# Patient Record
Sex: MALE | Race: WHITE | NOT HISPANIC OR LATINO | Employment: OTHER | URBAN - NONMETROPOLITAN AREA
[De-identification: names, ages, dates, MRNs, and addresses within clinical notes are randomized per-mention and may not be internally consistent; named-entity substitution may affect disease eponyms.]

---

## 2017-10-24 ENCOUNTER — OFFICE VISIT (OUTPATIENT)
Dept: FAMILY MEDICINE CLINIC | Facility: CLINIC | Age: 67
End: 2017-10-24

## 2017-10-24 DIAGNOSIS — G25.81 RESTLESS LEG SYNDROME: ICD-10-CM

## 2017-10-24 DIAGNOSIS — Z79.899 LONG TERM USE OF DRUG: ICD-10-CM

## 2017-10-24 DIAGNOSIS — I10 ESSENTIAL HYPERTENSION: ICD-10-CM

## 2017-10-24 DIAGNOSIS — K21.9 GASTROESOPHAGEAL REFLUX DISEASE WITHOUT ESOPHAGITIS: ICD-10-CM

## 2017-10-24 DIAGNOSIS — G51.8 PAIN DUE TO NEUROPATHY OF FACIAL NERVE: ICD-10-CM

## 2017-10-24 DIAGNOSIS — Z87.898 HISTORY OF PREDIABETES: ICD-10-CM

## 2017-10-24 DIAGNOSIS — G51.0 RIGHT-SIDED BELL'S PALSY: Primary | ICD-10-CM

## 2017-10-24 PROCEDURE — 80307 DRUG TEST PRSMV CHEM ANLYZR: CPT | Performed by: NURSE PRACTITIONER

## 2017-10-24 PROCEDURE — 99214 OFFICE O/P EST MOD 30 MIN: CPT | Performed by: NURSE PRACTITIONER

## 2017-10-24 RX ORDER — GABAPENTIN 100 MG/1
100 CAPSULE ORAL 3 TIMES DAILY
COMMUNITY
End: 2017-11-20 | Stop reason: DRUGHIGH

## 2017-10-24 NOTE — PROGRESS NOTES
Subjective   Álvaro Estrada is a 67 y.o. male who presents to the office to establish care and get referral to neuro for Bell's Palsy.     History of Present Illness     Patient states that this Bell's palsy started back in July of this year, patient went to the ER for this and stated that they placed him on prednisone and antibiotic and did a CT scan which was normal because they originally thought that he had a stroke.  Patient states that he had no arm or leg weakness and is continued to have no arm or leg weakness.  Patient went to the emergency room only because the right side of his face was drooping.  Patient states that the right sided nerve pain starts underneath his right ear goes up the right side of his face always up to his right eye, patient states that he has a constant dull pain but then random sharp pains as well along this nerve line.  Patient complains of watery eyes multiple times throughout the day.  Patient states that the air while there is his eyes when he goes outside. Taking oxtellar xr 300 mg BID prn for Bell's Palsy, along with soothing dry eye drops daily and ointment nightly.  The medication is helping with his eyes, however the oxtellar helps minimally and patient is still having significant right sided nerve pain.  Patient denies any problems with vision, swallowing, smell.  Patient has no known exposures to any viral infections since or any time before this Bell's palsy started.  Patient has tried to get referral to neuro but was unsuccessful and would really like to be referred to neurology since this has gone on for so long.     History:  1. RLS-takes Neupro 2 mg every 24 hrs and parmipexole 1 mg nightly, x years, medications work well, experiences restless leg syndrome during the day and at nighttime  2. BP-lisinopril 10mg daily and Traim terg 37.5/25mg qday after meal, patient does not check blood pressure at home but denies any associated symptoms with hypertension, has had this  "for several years  3. PreDM-metformin 500mg bid, patient states that lab work showed that he was a prediabetic, no side effects with medication.  4. Knee surgery recently, was on tramadol, no longer taking it,   5. GERD-controlled well on Omeprazole 20 mg PO daily  6.  Varicose veins in left leg  7. Mass on right side of face, patient states that he fell when he was 12 years old has been there ever since, patient denies any pain or issues with it on states it is not gotten any bigger.  Hard mass 3x3x2\" as a rough estimate  8.  Bell's palsy since July 2017    Patient going to the [facility] on [address] before, will request these records and review them to determine further care in addition to care provided today.    The following portions of the patient's history were reviewed and updated as appropriate: allergies, current medications, past family history, past medical history, past social history, past surgical history and problem list.    Review of Systems   Constitutional: Negative for activity change, appetite change, chills, diaphoresis, fatigue and fever.   HENT: Positive for drooling, ear pain (right), facial swelling, hearing loss and sinus pressure. Negative for congestion, dental problem, ear discharge, mouth sores, nosebleeds, postnasal drip, rhinorrhea, sinus pain, sneezing, sore throat, tinnitus and trouble swallowing.         Right sided facial dropping   Eyes: Positive for photophobia, pain, discharge, redness and itching. Negative for visual disturbance (sensitive to outside air).   Respiratory: Negative.  Negative for cough, chest tightness, shortness of breath and wheezing.    Cardiovascular: Negative.    Gastrointestinal: Negative.    Endocrine: Negative.    Musculoskeletal: Negative.    Skin: Negative.    Allergic/Immunologic: Negative.    Neurological: Negative.    Hematological: Negative.    Psychiatric/Behavioral: Negative.        No past medical history on file.    No family history on " file.       Objective   There were no vitals taken for this visit.  Physical Exam   Constitutional: He is oriented to person, place, and time. He appears well-developed and well-nourished. He is cooperative. He does not appear ill.   HENT:   Head:       Right Ear: Hearing, external ear and ear canal normal. Tympanic membrane is bulging.   Left Ear: Hearing, tympanic membrane, external ear and ear canal normal.   Nose: Mucosal edema and rhinorrhea present. Right sinus exhibits maxillary sinus tenderness ( slightly). Left sinus exhibits maxillary sinus tenderness (slightly).   Mouth/Throat: Oropharynx is clear and moist and mucous membranes are normal. No oropharyngeal exudate.   Eyes: EOM and lids are normal. Pupils are equal, round, and reactive to light. Right eye exhibits discharge ( clear watery discharge). Left eye exhibits discharge (clear watery discharge). Right conjunctiva is injected. Left conjunctiva is injected. Right eye exhibits normal extraocular motion and no nystagmus. Left eye exhibits normal extraocular motion and no nystagmus.   Neck: Normal range of motion. Neck supple.   Cardiovascular: Normal rate, regular rhythm, normal heart sounds and intact distal pulses.  Exam reveals no gallop and no friction rub.    No murmur heard.  Pulmonary/Chest: Effort normal and breath sounds normal. No respiratory distress. He has no wheezes. He has no rales.   Abdominal: Soft. Normal appearance, normal aorta and bowel sounds are normal. He exhibits no distension and no mass. There is no tenderness. There is no rebound and no guarding. No hernia.   Musculoskeletal: Normal range of motion. He exhibits no edema, tenderness or deformity.   Lymphadenopathy:     He has no cervical adenopathy.   Neurological: He is alert and oriented to person, place, and time. He has normal strength and normal reflexes. He displays normal reflexes. No cranial nerve deficit or sensory deficit. He exhibits normal muscle tone. He displays  a negative Romberg sign. Coordination normal.   Reflex Scores:       Patellar reflexes are 2+ on the right side and 2+ on the left side.  Skin: Skin is warm, dry and intact. No rash noted. He is not diaphoretic. No erythema. No pallor.   Psychiatric: He has a normal mood and affect. His speech is normal and behavior is normal. Thought content normal. Cognition and memory are normal. He is attentive.   Nursing note and vitals reviewed.       No flowsheet data found.      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  Did urine drug screen, will consider Neurontin plasma level if patient continues on Neurontin.  Referred to neurology.    Patient here to establish care, performed full history and physical.  Reviewed medical problems, patient did not need any refills at this time.  Will order lab work once I have gotten his records and free clinic in [place], if applicable.      Patient understands the risks associated with this controlled medication, including tolerance and addiction.  he also agrees to only obtain this medication from me or my collaborating physician, Dr. Adrian Watt, and not from a another provider, unless that provider is covering for me in my absence.  he also agrees to be compliant in dosing, and not self adjust the dose of medication.  A signed  controlled substance agreement is on file, and he has received a controlled substance education sheet at this visit.  he has also signed a consent for treatment with a controlled substance as per [facility] policy. IVORY was obtained.     Follow-up in one month.  Patient educated to call or follow-up sooner if condition worsens or does not get better.  Patient understands and in agreement with plan of care.  An After Visit Summary was printed and given to the patient.    Next appt in 1 mtn:  1.  Health maintenance  2.  Refills?  3.  Bell's palsy/neuro referral/neurotonin/consider neurotonin level        Current Outpatient Prescriptions:   •  gabapentin (NEURONTIN) 100 MG capsule, Take 100 mg by mouth 3 (Three) Times a Day. 2 tablets, TID prn for bells palsy, #180, NR by Dr. Watt, written script on 10/24/2017, Disp: , Rfl:   •  lisinopril (PRINIVIL,ZESTRIL) 10 MG tablet, Take 10 mg by mouth Daily. For HTN, Disp: , Rfl:   •  metFORMIN (GLUCOPHAGE) 500 MG tablet, Take 500 mg by mouth 2 (Two) Times a Day With Meals. For GERD, Disp: , Rfl:   •  omeprazole (priLOSEC) 20 MG capsule, Take 20 mg by mouth Daily. For GERD, Disp: , Rfl:   •  OXcarbazepine (TRILEPTAL) 300 MG tablet, Take 300 mg by mouth 2 (Two) Times a Day. XR 2 tablets daily for Bell's Palsy, Disp: , Rfl:   •  pramipexole (MIRAPEX) 1 MG tablet, Take 1 mg by mouth Every Night. For RLS, Disp: , Rfl:   •  rotigotine (NEUPRO) 1 MG/24HR patch 24 hour 24 hour patch, Place 1 patch on the skin Daily. For RLS, Disp: , Rfl:   •  triamterene-hydrochlorothiazide (MAXZIDE-25) 37.5-25 MG per tablet, Take 1 tablet by mouth Daily. Daily for HTN, Disp: , Rfl:       LILIANA Gonzalez        This document has been electronically signed by LILIANA Gonzalez on October 25, 2017 4:36 PM

## 2017-10-25 PROBLEM — K21.9 GASTROESOPHAGEAL REFLUX DISEASE WITHOUT ESOPHAGITIS: Status: ACTIVE | Noted: 2017-10-25

## 2017-10-25 PROBLEM — G25.81 RESTLESS LEG SYNDROME: Status: ACTIVE | Noted: 2017-10-25

## 2017-10-25 PROBLEM — Z87.898 HISTORY OF PREDIABETES: Status: ACTIVE | Noted: 2017-10-25

## 2017-10-25 PROBLEM — I10 ESSENTIAL HYPERTENSION: Status: ACTIVE | Noted: 2017-10-25

## 2017-10-25 LAB
AMPHET+METHAMPHET UR QL: NEGATIVE
BARBITURATES UR QL SCN: NEGATIVE
BENZODIAZ UR QL SCN: NEGATIVE
CANNABINOIDS SERPL QL: NEGATIVE
COCAINE UR QL: NEGATIVE
METHADONE UR QL SCN: NEGATIVE
OPIATES UR QL: NEGATIVE
OXYCODONE UR QL SCN: NEGATIVE

## 2017-10-25 RX ORDER — TRIAMTERENE AND HYDROCHLOROTHIAZIDE 37.5; 25 MG/1; MG/1
1 TABLET ORAL DAILY
COMMUNITY
End: 2018-05-04

## 2017-10-25 RX ORDER — LISINOPRIL 10 MG/1
10 TABLET ORAL DAILY
COMMUNITY
End: 2018-05-04

## 2017-10-25 RX ORDER — PRAMIPEXOLE DIHYDROCHLORIDE 1 MG/1
1 TABLET ORAL NIGHTLY
COMMUNITY
End: 2018-05-04 | Stop reason: SDUPTHER

## 2017-10-25 RX ORDER — OXCARBAZEPINE 300 MG/1
300 TABLET, FILM COATED ORAL 2 TIMES DAILY
COMMUNITY
End: 2018-05-04

## 2017-10-25 RX ORDER — OMEPRAZOLE 20 MG/1
20 CAPSULE, DELAYED RELEASE ORAL DAILY
COMMUNITY
End: 2019-06-28 | Stop reason: ALTCHOICE

## 2017-10-30 ENCOUNTER — DOCUMENTATION (OUTPATIENT)
Dept: FAMILY MEDICINE CLINIC | Facility: CLINIC | Age: 67
End: 2017-10-30

## 2017-10-30 NOTE — PROGRESS NOTES
Reviewed patient's records form [facility] ([facility]) in [place], [place].    On visit 3-9-17, patient stated that mandibular mass that he had on the right service face had been hurting for the past week with the first bite of food, patient denies any increase in size.  Patient was told to follow up if gotten worse, not mentioned in any other notes and during patient's visit with me, patient denied a bothering him at all in the past or presently.  Patient did start to have Bell's palsy symptoms on 7-.      Visit 7- follow-up from ER visit for Bell's palsy, prescribed Zostavax ×7 days and Medrol Dosepak.  Patient also started on metformin 500 mg twice a day due to elevated A1c.    Visit 9- patient still experiencing Bell's palsy, prescribed Oxcarbazepine 300 mg extended release twice a day when necessary and lubrifresh ointment for dry eyes.     Family history of hypertension on the father's side and macular degeneration on the mother's side.  Personal history of 30 pack years of smoking and surgical history of S/P of left knee arthroscopy.    Pertinent lab work:  10-4-16 CBC, vitamin B12 326, folate 12.4, vitamin D 24 low, total cholesterol 214 high,  high, blood sugar 95, CMP done, A1c 7.27 high  8-2-17 microalbumin normal, lipid panel showing total cholesterol 185, HDL 41, triglycerides 91,  high BUNs 33 high creatinine 0.9 to normal, CMP done, PSA 1, TSH normal, A1c 6.5 elevated  3-9-17 A1c 7.0 elevated  10-11-17 A1c 6.0 slightly elevated, CBC completed and normal

## 2017-10-30 NOTE — PROGRESS NOTES
Reviewed patient's ER visit at [facility] in [facility] on 6-.  Measures taken to rule out stroke, during this visit patient only had right sided facial issues, no issues with lower extremities or upper extremities.     Lab work done included   1.  EKG: Normal sinus rhythm with right bundle branch block with septal infarct  2.  Chest x-ray showed mild fluid volume overload, mild pulmonary edema  3. CT scan of head negative  4.  CMP, CBC  5. Lipid panel: Triglycerides 111, total cholesterol 182, HDL 99,   6. PT/INR    DX: Right sided cerebral palsy  Txt: Acyclovir 800mg 5x/day x 7days, artificial tears, medrol dose pack    Will discuss abnormal EKG and CXR with patient

## 2017-10-31 ENCOUNTER — TELEPHONE (OUTPATIENT)
Dept: FAMILY MEDICINE CLINIC | Facility: CLINIC | Age: 67
End: 2017-10-31

## 2017-11-02 NOTE — TELEPHONE ENCOUNTER
Spoke with patient earlier he called in this morning at around 8 am.    Relayed info below and changed his appt on 11/20 to 1030 am      Also pt stated that when he had EKG and chest xray they told him everything was fine

## 2017-11-20 ENCOUNTER — OFFICE VISIT (OUTPATIENT)
Dept: FAMILY MEDICINE CLINIC | Facility: CLINIC | Age: 67
End: 2017-11-20

## 2017-11-20 VITALS
TEMPERATURE: 97.7 F | SYSTOLIC BLOOD PRESSURE: 122 MMHG | RESPIRATION RATE: 16 BRPM | HEIGHT: 69 IN | WEIGHT: 215 LBS | DIASTOLIC BLOOD PRESSURE: 82 MMHG | OXYGEN SATURATION: 99 % | BODY MASS INDEX: 31.84 KG/M2 | HEART RATE: 79 BPM

## 2017-11-20 DIAGNOSIS — G51.0 RIGHT-SIDED BELL'S PALSY: Primary | ICD-10-CM

## 2017-11-20 PROCEDURE — 99213 OFFICE O/P EST LOW 20 MIN: CPT | Performed by: NURSE PRACTITIONER

## 2017-11-20 RX ORDER — GABAPENTIN 600 MG/1
600 TABLET ORAL 3 TIMES DAILY
COMMUNITY
End: 2018-05-04

## 2017-11-20 NOTE — PROGRESS NOTES
Subjective   Álvaro Estrada is a 67 y.o. male who presents to the office for F/U on bell's palsy.     History of Present Illness     Patient states that this Bell's palsy started back in July of this year, patient went to the ER for this and stated that they placed him on prednisone and antibiotic and did a CT scan which was normal because they originally thought that he had a stroke.  Patient states that he had no arm or leg weakness and is continued to have no arm or leg weakness.  Patient went to the emergency room only because the right side of his face was drooping.  Patient states that the right sided nerve pain starts underneath his right ear goes up the right side of his face always up to his right eye, patient states that he has a constant dull pain but then random sharp pains as well along this nerve line.  Patient complains of watery eyes multiple times throughout the day.  Patient states that the air while there is his eyes when he goes outside. Patient was initially taking oxtellar xr 300 mg BID prn for Bell's Palsy, along with soothing dry eye drops daily and ointment nightly.  The medication is helping with his eyes, however the oxtellar helped minimally and patient is still having significant right sided nerve pain.  Patient denies any problems with vision, swallowing, smell.  Patient has no known exposures to any viral infections since or any time before this Bell's palsy started.  Patient was then switched to gabapentin 200 mg TID for neuropathic pain related to Bell's palsy and referred to neuro Dr. Rodriguez in [facility].  Patient states that he has seen Dr. Rodriguez neurologist, that patient was changed to Neurontin 600 mg 3 times a day, he has an EEG scheduled for tomorrow, his vitamin B12 was low and he will be following up with in to be placed on supplements, and patient has an MRI scheduled for the 28th of this month.  Patient states that his pain is well controlled on the Neurontin 600 mg 3  times a day.  Patient states the condition has not changed except for less pain on the Neurontin, states that he is still having a lot of watery eye problems but the medication such as the eyedrops and ointment is helping.  Patient states that he follows up with Dr. Rodriguez on the 30th of this month.    Patient denies any chest pain, shortness of breath, heart palpitations, trouble breathing at nighttime, or any other issues that may indicate the need for follow-up EKG and chest x-ray from his abnormal EKG and chest x-ray on ER report.      Updated health maintenance, patient had TDap done in 2005 and refused other health measures.     The following portions of the patient's history were reviewed and updated as appropriate: allergies, current medications, past family history, past medical history, past social history, past surgical history and problem list.    Review of Systems   Constitutional: Negative for activity change, appetite change, chills, diaphoresis, fatigue and fever.   HENT: Positive for drooling, ear pain (right), facial swelling, hearing loss and sinus pressure. Negative for congestion, dental problem, ear discharge, mouth sores, nosebleeds, postnasal drip, rhinorrhea, sinus pain, sneezing, sore throat, tinnitus and trouble swallowing.         Right sided facial dropping   Eyes: Positive for photophobia, pain, discharge, redness and itching. Negative for visual disturbance (sensitive to outside air).   Respiratory: Negative.  Negative for cough, chest tightness, shortness of breath and wheezing.    Cardiovascular: Negative.    Gastrointestinal: Negative.    Endocrine: Negative.    Musculoskeletal: Negative.    Skin: Negative.    Allergic/Immunologic: Negative.    Neurological: Negative.    Hematological: Negative.    Psychiatric/Behavioral: Negative.        No past medical history on file.    No family history on file.       Objective   /82  Pulse 79  Temp 97.7 °F (36.5 °C) (Oral)   Resp 16  Ht  "69\" (175.3 cm)  Wt 215 lb (97.5 kg)  SpO2 99%  BMI 31.75 kg/m2  Physical Exam   Constitutional: He is oriented to person, place, and time. He appears well-developed and well-nourished. He is cooperative. He does not appear ill.   HENT:   Head:       Right Ear: Hearing, external ear and ear canal normal. Tympanic membrane is bulging.   Left Ear: Hearing, tympanic membrane, external ear and ear canal normal.   Nose: Mucosal edema and rhinorrhea present. Right sinus exhibits maxillary sinus tenderness ( slightly). Left sinus exhibits maxillary sinus tenderness (slightly).   Mouth/Throat: Oropharynx is clear and moist and mucous membranes are normal. No oropharyngeal exudate.   Eyes: EOM and lids are normal. Pupils are equal, round, and reactive to light. Right eye exhibits discharge ( clear watery discharge). Left eye exhibits discharge (clear watery discharge). Right conjunctiva is injected. Left conjunctiva is injected. Right eye exhibits normal extraocular motion and no nystagmus. Left eye exhibits normal extraocular motion and no nystagmus.   Neck: Normal range of motion. Neck supple.   Cardiovascular: Normal rate, regular rhythm, normal heart sounds and intact distal pulses.  Exam reveals no gallop and no friction rub.    No murmur heard.  Pulmonary/Chest: Effort normal and breath sounds normal. No respiratory distress. He has no wheezes. He has no rales.   Abdominal: Soft. Normal appearance, normal aorta and bowel sounds are normal. He exhibits no distension and no mass. There is no tenderness. There is no rebound and no guarding. No hernia.   Musculoskeletal: Normal range of motion. He exhibits no edema, tenderness or deformity.   Lymphadenopathy:     He has no cervical adenopathy.   Neurological: He is alert and oriented to person, place, and time. He has normal strength and normal reflexes. He displays normal reflexes. No cranial nerve deficit or sensory deficit. He exhibits normal muscle tone. He displays " a negative Romberg sign. Coordination normal.   Reflex Scores:       Patellar reflexes are 2+ on the right side and 2+ on the left side.  Skin: Skin is warm, dry and intact. No rash noted. He is not diaphoretic. No erythema. No pallor.   Psychiatric: He has a normal mood and affect. His speech is normal and behavior is normal. Thought content normal. Cognition and memory are normal. He is attentive.   Nursing note and vitals reviewed.       PHQ-2/PHQ-9 Depression Screening 11/20/2017   Little interest or pleasure in doing things 0   Feeling down, depressed, or hopeless 0   Total Score 0         Assessment/Plan   Álvaro was seen today for follow-up.    Diagnoses and all orders for this visit:    Right-sided Bell's palsy         Right-sided Bell's palsy-continue to follow up with neurologist, continue eye ointment and drops, continue gabapentin at new dosage, follow-up as needed    Patient educated to follow-up sooner than next scheduled appointment if condition(s) worse or do not improve. Patient states understanding and is in agreeance with plan of care. An After Visit Summary was printed and given to the patient.      Current Outpatient Prescriptions:   •  gabapentin (NEURONTIN) 600 MG tablet, Take 600 mg by mouth 3 (Three) Times a Day., Disp: , Rfl:   •  lisinopril (PRINIVIL,ZESTRIL) 10 MG tablet, Take 10 mg by mouth Daily. For HTN, Disp: , Rfl:   •  omeprazole (priLOSEC) 20 MG capsule, Take 20 mg by mouth Daily. For GERD, Disp: , Rfl:   •  OXcarbazepine (TRILEPTAL) 300 MG tablet, Take 300 mg by mouth 2 (Two) Times a Day. XR 2 tablets daily for Bell's Palsy, Disp: , Rfl:   •  pramipexole (MIRAPEX) 1 MG tablet, Take 1 mg by mouth Every Night. For RLS, Disp: , Rfl:   •  rotigotine (NEUPRO) 1 MG/24HR patch 24 hour 24 hour patch, Place 1 patch on the skin Daily. For RLS, Disp: , Rfl:   •  triamterene-hydrochlorothiazide (MAXZIDE-25) 37.5-25 MG per tablet, Take 1 tablet by mouth Daily. Daily for HTN, Disp: , Rfl:   •   metFORMIN (GLUCOPHAGE) 500 MG tablet, Take 500 mg by mouth 2 (Two) Times a Day With Meals. For GERD, Disp: , Rfl:       LILIANA Gonzalez        This document has been electronically signed by LILIANA Gonzalez on November 20, 2017 4:35 PM

## 2018-05-04 ENCOUNTER — OFFICE VISIT (OUTPATIENT)
Dept: FAMILY MEDICINE CLINIC | Facility: CLINIC | Age: 68
End: 2018-05-04

## 2018-05-04 VITALS
DIASTOLIC BLOOD PRESSURE: 72 MMHG | SYSTOLIC BLOOD PRESSURE: 118 MMHG | TEMPERATURE: 99.1 F | BODY MASS INDEX: 28.44 KG/M2 | HEIGHT: 69 IN | HEART RATE: 88 BPM | WEIGHT: 192 LBS | OXYGEN SATURATION: 97 %

## 2018-05-04 DIAGNOSIS — K21.9 GASTROESOPHAGEAL REFLUX DISEASE WITHOUT ESOPHAGITIS: ICD-10-CM

## 2018-05-04 DIAGNOSIS — Z90.49 HISTORY OF PAROTIDECTOMY: ICD-10-CM

## 2018-05-04 DIAGNOSIS — Z76.89 ENCOUNTER TO ESTABLISH CARE WITH NEW DOCTOR: ICD-10-CM

## 2018-05-04 DIAGNOSIS — K21.9 GASTROESOPHAGEAL REFLUX DISEASE, ESOPHAGITIS PRESENCE NOT SPECIFIED: ICD-10-CM

## 2018-05-04 DIAGNOSIS — G25.81 RESTLESS LEG SYNDROME: ICD-10-CM

## 2018-05-04 DIAGNOSIS — I82.402 ACUTE DEEP VEIN THROMBOSIS (DVT) OF LEFT LOWER EXTREMITY, UNSPECIFIED VEIN (HCC): ICD-10-CM

## 2018-05-04 DIAGNOSIS — H90.5 SENSORINEURAL HEARING LOSS (SNHL) OF RIGHT EAR, UNSPECIFIED HEARING STATUS ON CONTRALATERAL SIDE: ICD-10-CM

## 2018-05-04 DIAGNOSIS — Z93.1 GASTROSTOMY TUBE IN PLACE (HCC): ICD-10-CM

## 2018-05-04 DIAGNOSIS — C07 CARCINOMA OF PAROTID GLAND (HCC): Primary | ICD-10-CM

## 2018-05-04 DIAGNOSIS — E66.3 OVERWEIGHT (BMI 25.0-29.9): ICD-10-CM

## 2018-05-04 PROBLEM — G51.0 RIGHT-SIDED BELL'S PALSY: Status: RESOLVED | Noted: 2017-10-24 | Resolved: 2018-05-04

## 2018-05-04 PROBLEM — I10 ESSENTIAL HYPERTENSION: Status: RESOLVED | Noted: 2017-10-25 | Resolved: 2018-05-04

## 2018-05-04 PROBLEM — Z87.898 HISTORY OF PREDIABETES: Status: RESOLVED | Noted: 2017-10-25 | Resolved: 2018-05-04

## 2018-05-04 PROCEDURE — 99214 OFFICE O/P EST MOD 30 MIN: CPT | Performed by: FAMILY MEDICINE

## 2018-05-04 RX ORDER — EMOLLIENT COMBINATION NO.10
EMULSION (GRAM) TOPICAL
Refills: 0 | COMMUNITY
Start: 2018-04-04 | End: 2018-12-20

## 2018-05-04 RX ORDER — PRAMIPEXOLE DIHYDROCHLORIDE 1 MG/1
1 TABLET ORAL 2 TIMES DAILY
Qty: 180 TABLET | Refills: 0 | Status: SHIPPED | OUTPATIENT
Start: 2018-05-04 | End: 2018-09-28 | Stop reason: SDUPTHER

## 2018-05-04 RX ORDER — ONDANSETRON 4 MG/1
4 TABLET, FILM COATED ORAL EVERY 8 HOURS PRN
COMMUNITY
End: 2019-06-28

## 2018-05-04 RX ORDER — APIXABAN 5 MG/1
1 TABLET, FILM COATED ORAL 2 TIMES DAILY
Refills: 5 | COMMUNITY
Start: 2018-04-23 | End: 2018-12-20 | Stop reason: DRUGHIGH

## 2018-05-04 RX ORDER — LACTOSE-REDUCED FOOD/FIBER
LIQUID (ML) ORAL
COMMUNITY
End: 2018-12-20

## 2018-05-04 RX ORDER — HYDROCODONE BITARTRATE AND ACETAMINOPHEN 5; 325 MG/1; MG/1
1 TABLET ORAL
Refills: 0 | COMMUNITY
Start: 2018-04-12

## 2018-05-04 RX ORDER — PRAMIPEXOLE DIHYDROCHLORIDE 1 MG/1
1 TABLET ORAL NIGHTLY
Qty: 90 TABLET | Refills: 1 | Status: SHIPPED | OUTPATIENT
Start: 2018-05-04 | End: 2018-05-04 | Stop reason: SDUPTHER

## 2018-05-04 RX ORDER — ROTIGOTINE 2 MG/24H
PATCH, EXTENDED RELEASE TRANSDERMAL
Refills: 1 | COMMUNITY
Start: 2018-02-07 | End: 2018-05-04

## 2018-05-04 RX ORDER — OXYCODONE AND ACETAMINOPHEN 10; 325 MG/1; MG/1
TABLET ORAL
Refills: 0 | COMMUNITY
Start: 2018-03-14

## 2018-05-04 NOTE — PROGRESS NOTES
Subjective   Chief Complaint   Patient presents with   • Establish Care   • Leg Swelling     left leg since April, started after cancer radiation     Álvaro Estrada is a 68 y.o. male.   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  He was then referred to Dr Ramon for further treatment - chemotherapy and radiation have been performed and finished April 5th.  Port placed for chemotherapy on the left upper chest wall and G tube surgery was performed by Dr Polanco.   Patient is to wait 3 months for any additional treatment but is scheduled for a recheck with ENT scheduled May 22nd.  PET scan is ordered with Dr Ramon - July 23rd  Patient has been complaining of poor appetite and lack of nutrition - he was started on Jevity 2 bottles BID.  He could not tolerate 1 bottle every 2 hours.  Patient started experiencing left lower extremity edema and pain following the completion of radiation- he was diagnosed with a DVT and started on eliquis.    The following portions of the patient's history were reviewed and updated as appropriate: allergies, current medications, past family history, past medical history, past social history,  "past surgical history and problem list.    Review of Systems   Constitutional: Negative for appetite change, chills, fatigue and fever.   HENT: Negative for congestion, ear pain, rhinorrhea and sore throat.    Eyes: Negative for pain.   Respiratory: Negative for cough and shortness of breath.    Cardiovascular: Positive for leg swelling. Negative for chest pain and palpitations.   Gastrointestinal: Negative for abdominal pain, constipation, diarrhea and nausea.   Genitourinary: Negative for dysuria.   Musculoskeletal: Negative for back pain, joint swelling and neck pain.   Skin: Negative for rash.   Neurological: Negative for dizziness and headaches.   Psychiatric/Behavioral: Positive for sleep disturbance.       Objective   /72   Pulse 88   Temp 99.1 °F (37.3 °C) (Temporal Artery )   Ht 175.3 cm (69\")   Wt 87.1 kg (192 lb)   SpO2 97%   BMI 28.35 kg/m²   Physical Exam   Constitutional: He is oriented to person, place, and time. He appears well-developed and well-nourished.   HENT:   Head: Normocephalic and atraumatic.       Right Ear: Decreased hearing is noted.   Eyes: Pupils are equal, round, and reactive to light.   Neck: Neck supple. Muscular tenderness present. Decreased range of motion present.   Cardiovascular: Normal rate, regular rhythm and normal heart sounds.    Left lower extremity edema   Pulmonary/Chest: Effort normal and breath sounds normal. No respiratory distress. He has no wheezes. He has no rales.   Abdominal: Soft. Bowel sounds are normal.   Gastrostomy tube in place   Neurological: He is alert and oriented to person, place, and time.   Skin: Skin is warm and dry.   Psychiatric: He has a normal mood and affect.   Nursing note and vitals reviewed.      Assessment/Plan   Problems Addressed this Visit        Cardiovascular and Mediastinum    Acute deep vein thrombosis (DVT) of left lower extremity       Digestive    Gastroesophageal reflux disease without esophagitis    Gastrostomy tube " in place    Gastroesophageal reflux disease       Other    Restless leg syndrome    History of parotidectomy    Carcinoma of parotid gland - Primary    Overweight (BMI 25.0-29.9)      Other Visit Diagnoses     Encounter to establish care with new doctor        Sensorineural hearing loss (SNHL) of right ear, unspecified hearing status on contralateral side            derian aguilera [id number]  Patient will be following with oncology for controlled medications    Adjusted mirapex    Medical records reviewed with patient, copies made    Recheck in 3 months

## 2018-06-07 ENCOUNTER — OFFICE VISIT (OUTPATIENT)
Dept: FAMILY MEDICINE CLINIC | Facility: CLINIC | Age: 68
End: 2018-06-07

## 2018-06-07 VITALS
BODY MASS INDEX: 28.58 KG/M2 | OXYGEN SATURATION: 99 % | HEIGHT: 69 IN | WEIGHT: 193 LBS | SYSTOLIC BLOOD PRESSURE: 108 MMHG | HEART RATE: 93 BPM | DIASTOLIC BLOOD PRESSURE: 68 MMHG

## 2018-06-07 DIAGNOSIS — I82.402 ACUTE DEEP VEIN THROMBOSIS (DVT) OF LEFT LOWER EXTREMITY, UNSPECIFIED VEIN (HCC): ICD-10-CM

## 2018-06-07 DIAGNOSIS — E66.3 OVERWEIGHT (BMI 25.0-29.9): ICD-10-CM

## 2018-06-07 DIAGNOSIS — C07 CARCINOMA OF PAROTID GLAND (HCC): Primary | ICD-10-CM

## 2018-06-07 DIAGNOSIS — Z90.49 HISTORY OF PAROTIDECTOMY: ICD-10-CM

## 2018-06-07 DIAGNOSIS — M54.2 MUSCULOSKELETAL NECK PAIN: ICD-10-CM

## 2018-06-07 DIAGNOSIS — Z93.1 GASTROSTOMY TUBE IN PLACE (HCC): ICD-10-CM

## 2018-06-07 PROBLEM — K21.9 GASTROESOPHAGEAL REFLUX DISEASE: Status: RESOLVED | Noted: 2018-05-04 | Resolved: 2018-06-07

## 2018-06-07 PROCEDURE — 99213 OFFICE O/P EST LOW 20 MIN: CPT | Performed by: FAMILY MEDICINE

## 2018-06-07 NOTE — PROGRESS NOTES
Subjective   Chief Complaint   Patient presents with   • Carcinoma of paratid gland     4 weeks     Álvaro Estrada is a 68 y.o. male.   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  He was then referred to Dr Ramon for further treatment - chemotherapy and radiation have been performed and finished April 5th.  Port placed for chemotherapy on the left upper chest wall and G tube surgery was performed by Dr Polanco.   Patient is to wait 3 months for any additional treatment but is scheduled for a recheck with ENT scheduled May 22nd - decreased hearing but recheck scheduled after PET scan.  PET scan is ordered with Dr Ramon - July 21st  Patient has been complaining of poor appetite and lack of nutrition - he was started on Jevity 2 bottles BID.  He could not tolerate 1 bottle every 2 hours.  Patient started experiencing left lower extremity edema and pain following the completion of radiation- he was diagnosed with a DVT and started on eliquis.    The following portions of the patient's history were reviewed and updated as appropriate: allergies, current medications, past family history, past medical history, past social history, past surgical history and problem list.    Review of Systems   Constitutional: Negative for appetite change,  "chills, fatigue and fever.   HENT: Positive for sore throat. Negative for congestion, ear pain and rhinorrhea.    Eyes: Negative for pain.   Respiratory: Negative for cough and shortness of breath.    Cardiovascular: Negative for chest pain and palpitations.   Gastrointestinal: Negative for abdominal pain, constipation, diarrhea and nausea.   Genitourinary: Negative for dysuria.   Musculoskeletal: Positive for neck pain and neck stiffness. Negative for back pain and joint swelling.   Skin: Negative for rash.   Neurological: Negative for dizziness and headaches.       Objective   /68   Pulse 93   Ht 175.3 cm (69.02\")   Wt 87.5 kg (193 lb)   SpO2 99%   BMI 28.49 kg/m²   Physical Exam   Constitutional: He is oriented to person, place, and time. He appears well-developed and well-nourished.   HENT:   Head: Normocephalic and atraumatic.   Mouth/Throat: Mucous membranes are dry. Posterior oropharyngeal erythema present.   Facial depression of the right side of face secondary to surgical excision of parotid gland   Eyes: Pupils are equal, round, and reactive to light.   Neck: Neck supple. Muscular tenderness present. Decreased range of motion present.   Cardiovascular: Normal rate, regular rhythm and normal heart sounds.    Pulmonary/Chest: Effort normal and breath sounds normal. No respiratory distress. He has no wheezes. He has no rales.   Abdominal: Soft. Bowel sounds are normal.   G tube   Neurological: He is alert and oriented to person, place, and time.   Skin: Skin is warm and dry.   Psychiatric: He has a normal mood and affect.   Nursing note and vitals reviewed.      Assessment/Plan   Problems Addressed this Visit        Cardiovascular and Mediastinum    Acute deep vein thrombosis (DVT) of left lower extremity       Digestive    Gastrostomy tube in place       Other    History of parotidectomy    Carcinoma of parotid gland - Primary    Overweight (BMI 25.0-29.9)      Other Visit Diagnoses     " Musculoskeletal neck pain            Patient's Body mass index is 28.49 kg/m². BMI is above normal parameters. Recommendations include: nutrition counseling.    Needs to increase fluid intake    Recommended topical medication - salonpas, aspercream for neck pain and tension    Recheck in 4 weeks

## 2018-06-28 ENCOUNTER — OFFICE VISIT (OUTPATIENT)
Dept: FAMILY MEDICINE CLINIC | Facility: CLINIC | Age: 68
End: 2018-06-28

## 2018-06-28 VITALS
BODY MASS INDEX: 28.14 KG/M2 | HEART RATE: 75 BPM | WEIGHT: 190 LBS | HEIGHT: 69 IN | DIASTOLIC BLOOD PRESSURE: 76 MMHG | OXYGEN SATURATION: 98 % | SYSTOLIC BLOOD PRESSURE: 124 MMHG

## 2018-06-28 DIAGNOSIS — Z23 NEED FOR PNEUMOCOCCAL VACCINE: ICD-10-CM

## 2018-06-28 DIAGNOSIS — R05.9 COUGH: ICD-10-CM

## 2018-06-28 DIAGNOSIS — E66.3 OVERWEIGHT (BMI 25.0-29.9): ICD-10-CM

## 2018-06-28 DIAGNOSIS — I82.402 ACUTE DEEP VEIN THROMBOSIS (DVT) OF LEFT LOWER EXTREMITY, UNSPECIFIED VEIN (HCC): ICD-10-CM

## 2018-06-28 DIAGNOSIS — Z93.1 GASTROSTOMY TUBE IN PLACE (HCC): ICD-10-CM

## 2018-06-28 DIAGNOSIS — C07 CARCINOMA OF PAROTID GLAND (HCC): Primary | ICD-10-CM

## 2018-06-28 DIAGNOSIS — Z90.49 HISTORY OF PAROTIDECTOMY: ICD-10-CM

## 2018-06-28 DIAGNOSIS — G25.81 RESTLESS LEG SYNDROME: ICD-10-CM

## 2018-06-28 PROCEDURE — 99214 OFFICE O/P EST MOD 30 MIN: CPT | Performed by: FAMILY MEDICINE

## 2018-06-28 PROCEDURE — 90670 PCV13 VACCINE IM: CPT | Performed by: FAMILY MEDICINE

## 2018-06-28 PROCEDURE — G0009 ADMIN PNEUMOCOCCAL VACCINE: HCPCS | Performed by: FAMILY MEDICINE

## 2018-06-28 NOTE — PROGRESS NOTES
Subjective   Chief Complaint   Patient presents with   • carcinoma of parotid gland     4 weeks   • wants xanax     Álvaro Estrada is a 68 y.o. male.   carcinoma of parotid gland (4 weeks) and wants xanax    History of Present Illness     [facility] - parotid carcinoma, nausea, RLS, GERD, DVT    Diagnosed with a DVT of the left lower extremity - managed with eliquis  Recheck yesterday indicated there was no change in blood clot    RLS - managed with mirapex    Follow up with ENT done 6/07/18 with Dr Vieira ([place], [place])  He was being seen for complaints of right TM perforation with drainage  But he continues to have complaints of decreased hearing.   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  He was then referred to Dr Ramon for further treatment - chemotherapy and radiation have been performed and finished April 5th.  Port placed for chemotherapy on the left upper chest wall and gastrostomy tube surgery was performed by Dr Polanco.   PET scan is ordered with Dr Ramon - July 21st  Poor appetite and lack of nutrition - has resolved, he is able to maintain his weight and is no longer in need of gastrostomy tube - he is hoping to have this removed.    The following portions of the patient's history were reviewed and updated as appropriate: allergies, current medications, past family history, past medical history, past social history, past surgical history and problem list.    Review of Systems   Constitutional: Negative for appetite change, chills,  "fatigue and fever.   HENT: Negative for congestion, ear pain, rhinorrhea and sore throat.         Jaw pain   Eyes: Negative for pain.   Respiratory: Positive for cough. Negative for shortness of breath.    Cardiovascular: Negative for chest pain and palpitations.   Gastrointestinal: Negative for abdominal pain, constipation, diarrhea and nausea.   Genitourinary: Negative for dysuria.   Musculoskeletal: Negative for back pain, joint swelling and neck pain.   Skin: Negative for rash.   Neurological: Negative for dizziness and headaches.       Objective   /76   Pulse 75   Ht 175.3 cm (69.02\")   Wt 86.2 kg (190 lb)   SpO2 98%   BMI 28.05 kg/m²   Physical Exam   Constitutional: He is oriented to person, place, and time. He appears well-developed and well-nourished.   HENT:   Head: Normocephalic and atraumatic.   Facial depression of the right side of face secondary to surgical excision of parotid gland   Facial paralysis   Eyes: Pupils are equal, round, and reactive to light.   Neck: Neck supple. Muscular tenderness present. Decreased range of motion present.   Cardiovascular: Normal rate, regular rhythm and normal heart sounds.    Pulmonary/Chest: Effort normal and breath sounds normal. No respiratory distress. He has no wheezes. He has no rales.   Abdominal: Soft. Bowel sounds are normal.       Neurological: He is alert and oriented to person, place, and time.   Skin: Skin is warm and dry.   Psychiatric: He has a normal mood and affect.   Nursing note and vitals reviewed.      Assessment/Plan   Problems Addressed this Visit        Cardiovascular and Mediastinum    Acute deep vein thrombosis (DVT) of left lower extremity       Digestive    Gastrostomy tube in place       Other    Restless leg syndrome    History of parotidectomy    Carcinoma of parotid gland - Primary    Overweight (BMI 25.0-29.9)      Other Visit Diagnoses     Need for pneumococcal vaccine        Relevant Orders    Pneumococcal Conjugate " Vaccine 13-Valent All (PCV13) (Completed)    Cough            Recommended tetanus, pneumococcal, and shingles    prevnar 13 today    Insurance does not cover tetanus - declined    Reviewed ENT consultation notes    Monitor cough - ENT notes indicated he believed this was reflux    Patient's Body mass index is 28.05 kg/m². BMI is above normal parameters. Recommendations include: exercise counseling and nutrition counseling.    Recheck in 3 months

## 2018-09-28 ENCOUNTER — OFFICE VISIT (OUTPATIENT)
Dept: FAMILY MEDICINE CLINIC | Facility: CLINIC | Age: 68
End: 2018-09-28

## 2018-09-28 VITALS
HEIGHT: 69 IN | DIASTOLIC BLOOD PRESSURE: 76 MMHG | BODY MASS INDEX: 29.18 KG/M2 | OXYGEN SATURATION: 98 % | WEIGHT: 197 LBS | HEART RATE: 68 BPM | SYSTOLIC BLOOD PRESSURE: 128 MMHG

## 2018-09-28 DIAGNOSIS — F32.0 MILD SINGLE CURRENT EPISODE OF MAJOR DEPRESSIVE DISORDER (HCC): Primary | ICD-10-CM

## 2018-09-28 DIAGNOSIS — G89.18 PAIN FOLLOWING SURGERY OR PROCEDURE: ICD-10-CM

## 2018-09-28 DIAGNOSIS — Z90.49 HISTORY OF PAROTIDECTOMY: ICD-10-CM

## 2018-09-28 DIAGNOSIS — E66.3 OVERWEIGHT (BMI 25.0-29.9): ICD-10-CM

## 2018-09-28 DIAGNOSIS — G25.81 RESTLESS LEG SYNDROME: ICD-10-CM

## 2018-09-28 PROCEDURE — 99214 OFFICE O/P EST MOD 30 MIN: CPT | Performed by: FAMILY MEDICINE

## 2018-09-28 RX ORDER — PRAMIPEXOLE DIHYDROCHLORIDE 1 MG/1
1 TABLET ORAL 2 TIMES DAILY
Qty: 60 TABLET | Refills: 2 | Status: SHIPPED | OUTPATIENT
Start: 2018-09-28 | End: 2018-12-20 | Stop reason: SDUPTHER

## 2018-09-28 RX ORDER — LIDOCAINE 50 MG/G
1 PATCH TOPICAL EVERY 24 HOURS
Qty: 30 PATCH | Refills: 5 | Status: SHIPPED | OUTPATIENT
Start: 2018-09-28 | End: 2019-06-28

## 2018-09-28 RX ORDER — ESCITALOPRAM OXALATE 10 MG/1
10 TABLET ORAL DAILY
Qty: 30 TABLET | Refills: 5 | Status: SHIPPED | OUTPATIENT
Start: 2018-09-28 | End: 2019-06-28

## 2018-09-28 NOTE — PROGRESS NOTES
Subjective   Chief Complaint   Patient presents with   • Follow-up     3 month    • Restless Legs Syndrome     pramipexole refill- want dosage changed      Álvaro Estrada is a 68 y.o. male.   Follow-up (3 month ) and Restless Legs Syndrome (pramipexole refill- want dosage changed )    History of Present Illness     [facility] - parotid carcinoma, nausea, RLS, GERD, DVT    Diagnosed with a DVT of the left lower extremity - managed with eliquis  Recheck yesterday indicated there was no change in blood clot    RLS - managed with mirapex  Asking for medication refill    Follow up with ENT done 6/07/18 with Dr Vieira ([place], [place])  He was being seen for complaints of right TM perforation with drainage  But he continues to have complaints of decreased hearing.   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  He was then referred to Dr Ramon for further treatment - chemotherapy and radiation have been performed and finished April 5th.  Port placed for chemotherapy on the left upper chest wall and gastrostomy tube surgery was performed by Dr Polanco.   PET scan is ordered with Dr Ramon - July 21st  Poor appetite and lack of nutrition - has resolved, he is able to maintain his weight and is no longer in need of gastrostomy tube - he is hoping to have this removed.    Asking for nerve medication to help with irritability and agitation  Rated 2/10 today    C/o significant right sided neck and shoulder pain  Extremely sensitive to the touch    The following portions of the  "patient's history were reviewed and updated as appropriate: allergies, current medications, past family history, past medical history, past social history, past surgical history and problem list.    Review of Systems   Constitutional: Negative for appetite change, chills, fatigue and fever.   HENT: Negative for congestion, ear pain, rhinorrhea and sore throat.    Eyes: Negative for pain.   Respiratory: Negative for cough and shortness of breath.    Cardiovascular: Negative for chest pain and palpitations.   Gastrointestinal: Negative for abdominal pain, constipation, diarrhea and nausea.   Genitourinary: Negative for dysuria.   Musculoskeletal: Positive for neck pain and neck stiffness. Negative for back pain and joint swelling.   Skin: Negative for rash.   Neurological: Negative for dizziness and headaches.   Psychiatric/Behavioral: Positive for agitation, decreased concentration and dysphoric mood.       Objective   /76   Pulse 68   Ht 175.3 cm (69.02\")   Wt 89.4 kg (197 lb)   SpO2 98%   BMI 29.08 kg/m²   Physical Exam   Constitutional: He is oriented to person, place, and time. He appears well-developed and well-nourished.   HENT:   Head: Normocephalic and atraumatic.       Eyes: Pupils are equal, round, and reactive to light.   Neck: Neck supple. Muscular tenderness present. Decreased range of motion present.       Cardiovascular: Normal rate, regular rhythm and normal heart sounds.    Pulmonary/Chest: Effort normal and breath sounds normal. No respiratory distress. He has no wheezes. He has no rales.   Abdominal: Soft. Bowel sounds are normal.   Neurological: He is alert and oriented to person, place, and time.   Skin: Skin is warm and dry.   Psychiatric: He has a normal mood and affect.   Nursing note and vitals reviewed.      Assessment/Plan   Problems Addressed this Visit        Other    Restless leg syndrome    History of parotidectomy    Overweight (BMI 25.0-29.9)    Mild single current episode " of major depressive disorder (CMS/HCC) - Primary    Relevant Medications    escitalopram (LEXAPRO) 10 MG tablet    Pain following surgery or procedure    Relevant Medications    lidocaine (LIDODERM) 5 %        Start lidoderm patches for muscle pain secondary to surgical procedure  Consider compound cream    Patient's Body mass index is 29.08 kg/m². BMI is above normal parameters. Recommendations include: exercise counseling and nutrition counseling.    Refilled mirapex for RLS    Start lexapro for depression    Follow up with specialists as scheduled    Shingles and flu recommended - declined    Recheck in 3 months or sooner as needed

## 2018-10-22 RX ORDER — PRAMIPEXOLE DIHYDROCHLORIDE 1 MG/1
TABLET ORAL
Qty: 90 TABLET | Refills: 1 | OUTPATIENT
Start: 2018-10-22

## 2018-12-20 ENCOUNTER — OFFICE VISIT (OUTPATIENT)
Dept: FAMILY MEDICINE CLINIC | Facility: CLINIC | Age: 68
End: 2018-12-20

## 2018-12-20 VITALS
HEIGHT: 69 IN | SYSTOLIC BLOOD PRESSURE: 128 MMHG | WEIGHT: 206.2 LBS | BODY MASS INDEX: 30.54 KG/M2 | OXYGEN SATURATION: 98 % | HEART RATE: 73 BPM | DIASTOLIC BLOOD PRESSURE: 80 MMHG

## 2018-12-20 DIAGNOSIS — G25.81 RESTLESS LEG SYNDROME: ICD-10-CM

## 2018-12-20 DIAGNOSIS — E66.3 OVERWEIGHT (BMI 25.0-29.9): ICD-10-CM

## 2018-12-20 DIAGNOSIS — Z90.49 HISTORY OF PAROTIDECTOMY: ICD-10-CM

## 2018-12-20 DIAGNOSIS — F32.0 MILD SINGLE CURRENT EPISODE OF MAJOR DEPRESSIVE DISORDER (HCC): ICD-10-CM

## 2018-12-20 DIAGNOSIS — Z00.00 ENCOUNTER FOR MEDICARE ANNUAL WELLNESS EXAM: Primary | ICD-10-CM

## 2018-12-20 DIAGNOSIS — Z79.01 CURRENT USE OF LONG TERM ANTICOAGULATION: ICD-10-CM

## 2018-12-20 DIAGNOSIS — C07 CARCINOMA OF PAROTID GLAND (HCC): ICD-10-CM

## 2018-12-20 PROCEDURE — 99214 OFFICE O/P EST MOD 30 MIN: CPT | Performed by: FAMILY MEDICINE

## 2018-12-20 PROCEDURE — G0438 PPPS, INITIAL VISIT: HCPCS | Performed by: FAMILY MEDICINE

## 2018-12-20 RX ORDER — PRAMIPEXOLE DIHYDROCHLORIDE 1 MG/1
1 TABLET ORAL 2 TIMES DAILY
Qty: 60 TABLET | Refills: 2 | Status: SHIPPED | OUTPATIENT
Start: 2018-12-20 | End: 2019-03-20 | Stop reason: SDUPTHER

## 2018-12-20 RX ORDER — APIXABAN 2.5 MG/1
2.5 TABLET, FILM COATED ORAL 2 TIMES DAILY
Refills: 3 | COMMUNITY
Start: 2018-12-04

## 2018-12-20 RX ORDER — CYCLOBENZAPRINE HCL 5 MG
5 TABLET ORAL
Refills: 0 | COMMUNITY
Start: 2018-12-04 | End: 2019-03-20 | Stop reason: SDUPTHER

## 2018-12-20 NOTE — PROGRESS NOTES
Subjective   Chief Complaint   Patient presents with   • Depression     3 month follow up   • Med Refill     pramipexole     Álvaro Estrada is a 68 y.o. male.   Depression (3 month follow up) and Med Refill (pramipexole)    History of Present Illness     [facility] - parotid carcinoma, nausea, RLS, GERD, DVT    DVT of the left lower extremity - managed with eliquis  Vascular has decreased the dose of the anticoagulant to 2.5mg BID    RLS - managed with mirapex  Asking for medication refill    Follow up with ENT done 6/07/18 with Dr Vieira ([place], [place])  He was being seen for complaints of right TM perforation with drainage  Currently fitted for bilateral hearing aids  Follow up scheduled with ENT - Pat Eduardo - hearing device adjustment  Scheduled for tomorrow    Patient was diagnosed with with head and neck cancer - 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  He was then referred to Dr Ramon for further treatment - chemotherapy and radiation have been performed and finished April 5th.  Port placed for chemotherapy on the left upper chest wall and gastrostomy tube surgery was performed by Dr Polanco.   PET scan is ordered with Dr Ramon - July 21st  Poor appetite and lack of nutrition - has resolved, he is able to maintain his weight and is no longer in need of gastrostomy tube - he is hoping to have this removed.    Depression - managed with lexapro    Due for a medicare wellness exam    The following portions of the patient's history were reviewed and updated as appropriate: allergies, current medications, past family history, past medical history, past social history, past surgical history and problem  "list.    Review of Systems   Constitutional: Negative for appetite change, chills, fatigue and fever.   HENT: Negative for congestion, ear pain, rhinorrhea and sore throat.    Eyes: Negative for pain.   Respiratory: Negative for cough and shortness of breath.    Cardiovascular: Negative for chest pain and palpitations.   Gastrointestinal: Negative for abdominal pain, constipation, diarrhea and nausea.   Genitourinary: Negative for dysuria.   Musculoskeletal: Negative for back pain, joint swelling and neck pain.   Skin: Negative for rash.   Neurological: Negative for dizziness and headaches.       Objective   /80   Pulse 73   Ht 175.3 cm (69\")   Wt 93.5 kg (206 lb 3.2 oz)   SpO2 98%   BMI 30.45 kg/m²   Physical Exam   Constitutional: He is oriented to person, place, and time. He appears well-developed and well-nourished.   HENT:   Head: Normocephalic and atraumatic.       Bilateral hearing aids     Eyes: Pupils are equal, round, and reactive to light.   Neck: Neck supple. Muscular tenderness present. Decreased range of motion present.   Cardiovascular: Normal rate, regular rhythm and normal heart sounds.   Pulmonary/Chest: Effort normal and breath sounds normal. No respiratory distress. He has no wheezes. He has no rales.   Abdominal: Soft. Bowel sounds are normal.   Neurological: He is alert and oriented to person, place, and time.   Skin: Skin is warm and dry.   Psychiatric: He has a normal mood and affect.   Nursing note and vitals reviewed.      Assessment/Plan   Problems Addressed this Visit        Other    Restless leg syndrome    History of parotidectomy    Carcinoma of parotid gland (CMS/[facility])    Overweight (BMI 25.0-29.9)    Mild single current episode of major depressive disorder (CMS/[facility])    Current use of long term anticoagulation      Other Visit Diagnoses     Encounter for Medicare annual wellness exam    -  Primary        Medicare wellness exam done today    Patient's Body mass index is 30.45 " kg/m². BMI is above normal parameters. Recommendations include: exercise counseling and nutrition counseling.    Refilled mirapex for RLS    Continue with lexapro    Recheck in 3 months

## 2018-12-20 NOTE — PROGRESS NOTES
QUICK REFERENCE INFORMATION:  The ABCs of the Annual Wellness Visit    Initial Medicare Wellness Visit    HEALTH RISK ASSESSMENT    1950    Recent Hospitalizations:  Recently treated at the following:  Other: [place].        Current Medical Providers:  Patient Care Team:  Eugenie Adams MD as PCP - General (Family Medicine)        Smoking Status:  Social History     Tobacco Use   Smoking Status Current Some Day Smoker   Smokeless Tobacco Never Used   Tobacco Comment    trying to slow down       Alcohol Consumption:  Social History     Substance and Sexual Activity   Alcohol Use No       Depression Screen:   PHQ-2/PHQ-9 Depression Screening 12/20/2018   Little interest or pleasure in doing things 0   Feeling down, depressed, or hopeless 0   Total Score 0       Health Habits and Functional and Cognitive Screening:  Functional & Cognitive Status 12/20/2018   Do you have difficulty preparing food and eating? No   Do you have difficulty bathing yourself, getting dressed or grooming yourself? No   Do you have difficulty using the toilet? No   Do you have difficulty moving around from place to place? No   Do you have trouble with steps or getting out of a bed or a chair? No   In the past year have you fallen or experienced a near fall? No   Current Diet Well Balanced Diet   Dental Exam Not up to date   Eye Exam Up to date   Exercise (times per week) 2 times per week   Current Exercise Activities Include Housecleaning   Do you need help using the phone?  No   Are you deaf or do you have serious difficulty hearing?  No   Do you need help with transportation? No   Do you need help shopping? No   Do you need help preparing meals?  No   Do you need help with housework?  No   Do you need help with laundry? No   Do you need help taking your medications? No   Do you need help managing money? No   Do you ever drive or ride in a car without wearing a seat belt? No   Have you felt unusual stress, anger or loneliness  in the last month? No   Who do you live with? Spouse   If you need help, do you have trouble finding someone available to you? No   Have you been bothered in the last four weeks by sexual problems? No   Do you have difficulty concentrating, remembering or making decisions? No     Fall Risk Assessment  Fallen in past 6 months: 0--> No  Mental Status: 0--> no mental status change  Mobility: 0--> No mobility issues  Medications: 1--> Narcotics  Total Fall Risk Score: 2    Does the patient have evidence of cognitive impairment? No    Asiprin use counseling: effient      Recent Lab Results:    Visual Acuity:  No exam data present    Age-appropriate Screening Schedule:  Refer to the list below for future screening recommendations based on patient's age, sex and/or medical conditions. Orders for these recommended tests are listed in the plan section. The patient has been provided with a written plan.    Health Maintenance   Topic Date Due   • PNEUMOCOCCAL VACCINES (65+ LOW/MEDIUM RISK) (2 of 2 - PPSV23) 06/28/2019   • COLONOSCOPY  11/20/2027   • INFLUENZA VACCINE  Addressed   • TDAP/TD VACCINES  Discontinued   • ZOSTER VACCINE  Discontinued        Subjective   History of Present Illness    Álvaro Estrada is a 68 y.o. male who presents for an Annual Wellness Visit.    The following portions of the patient's history were reviewed and updated as appropriate: allergies, current medications, past family history, past medical history, past social history, past surgical history and problem list.    Outpatient Medications Prior to Visit   Medication Sig Dispense Refill   • cyclobenzaprine (FLEXERIL) 5 MG tablet Take 5 mg by mouth every night at bedtime.  0   • ELIQUIS 2.5 MG tablet tablet Take 2.5 mg by mouth 2 (Two) Times a Day.  3   • escitalopram (LEXAPRO) 10 MG tablet Take 1 tablet by mouth Daily. 30 tablet 5   • HYDROcodone-acetaminophen (NORCO) 5-325 MG per tablet Take 1 tablet by mouth. 1 to 2 tablets by mouth every 4 to 6  "hours as needed for pain  0   • lidocaine (LIDODERM) 5 % Place 1 patch on the skin as directed by provider Daily. Remove & Discard patch within 12 hours or as directed by MD 30 patch 5   • omeprazole (priLOSEC) 20 MG capsule Take 20 mg by mouth Daily. For GERD     • ondansetron (ZOFRAN) 4 MG tablet Take 4 mg by mouth Every 8 (Eight) Hours As Needed for Nausea or Vomiting.     • Nutritional Supplements (JEVITY 1.2 LANCE) liquid Take  by mouth.     • pramipexole (MIRAPEX) 1 MG tablet Take 1 tablet by mouth 2 (Two) Times a Day. For RLS 60 tablet 2   • Wound Dressings (ELIAS CREAM) emulsion   0   • oxyCODONE-acetaminophen (PERCOCET)  MG per tablet TAKE 1 TABLET BY MOUTH EVERY 4 TO 6 HOURS AS NEEDED FOR PAIN  0   • ELIQUIS 5 MG tablet tablet Take 1 tablet by mouth 2 (Two) Times a Day.  5     No facility-administered medications prior to visit.        Patient Active Problem List   Diagnosis   • Restless leg syndrome   • Gastroesophageal reflux disease without esophagitis   • Acute deep vein thrombosis (DVT) of left lower extremity (CMS/HCC)   • History of parotidectomy   • Carcinoma of parotid gland (CMS/HCC)   • Gastrostomy tube in place (CMS/HCC)   • Overweight (BMI 25.0-29.9)   • Mild single current episode of major depressive disorder (CMS/HCC)   • Pain following surgery or procedure   • Current use of long term anticoagulation       Advance Care Planning:  power of  for healthcare on file    Identification of Risk Factors:  Risk factors include: weight , unhealthy diet and cardiovascular risk.    Review of Systems    Compared to one year ago, the patient feels his physical health is worse.  Compared to one year ago, the patient feels his mental health is the same.    Objective     Physical Exam    Vitals:    12/20/18 1338   BP: 128/80   Pulse: 73   SpO2: 98%   Weight: 93.5 kg (206 lb 3.2 oz)   Height: 175.3 cm (69\")       Patient's Body mass index is 30.45 kg/m². BMI is above normal parameters. " Recommendations include: exercise counseling and nutrition counseling.      Assessment/Plan   Patient Self-Management and Personalized Health Advice  The patient has been provided with information about: diet, exercise and weight management and preventive services including:   · Exercise counseling provided, Fall Risk assessment done, Fall Risk plan of care done, Influenza vaccine, Nutrition counseling provided, Pneumococcal vaccine .    Visit Diagnoses:    ICD-10-CM ICD-9-CM   1. Encounter for Medicare annual wellness exam Z00.00 V70.0   2. Overweight (BMI 25.0-29.9) E66.3 278.02   3. Restless leg syndrome G25.81 333.94   4. Carcinoma of parotid gland (CMS/HCC) C07 142.0   5. History of parotidectomy Z90.49 V15.29   6. Mild single current episode of major depressive disorder (CMS/HCC) F32.0 296.21   7. Current use of long term anticoagulation Z79.01 V58.61       No orders of the defined types were placed in this encounter.      Outpatient Encounter Medications as of 12/20/2018   Medication Sig Dispense Refill   • cyclobenzaprine (FLEXERIL) 5 MG tablet Take 5 mg by mouth every night at bedtime.  0   • ELIQUIS 2.5 MG tablet tablet Take 2.5 mg by mouth 2 (Two) Times a Day.  3   • escitalopram (LEXAPRO) 10 MG tablet Take 1 tablet by mouth Daily. 30 tablet 5   • HYDROcodone-acetaminophen (NORCO) 5-325 MG per tablet Take 1 tablet by mouth. 1 to 2 tablets by mouth every 4 to 6 hours as needed for pain  0   • lidocaine (LIDODERM) 5 % Place 1 patch on the skin as directed by provider Daily. Remove & Discard patch within 12 hours or as directed by MD 30 patch 5   • omeprazole (priLOSEC) 20 MG capsule Take 20 mg by mouth Daily. For GERD     • ondansetron (ZOFRAN) 4 MG tablet Take 4 mg by mouth Every 8 (Eight) Hours As Needed for Nausea or Vomiting.     • pramipexole (MIRAPEX) 1 MG tablet Take 1 tablet by mouth 2 (Two) Times a Day. For RLS 60 tablet 2   • [DISCONTINUED] Nutritional Supplements (JEVITY 1.2 LANCE) liquid Take  by  mouth.     • [DISCONTINUED] pramipexole (MIRAPEX) 1 MG tablet Take 1 tablet by mouth 2 (Two) Times a Day. For RLS 60 tablet 2   • [DISCONTINUED] Wound Dressings (ELIAS CREAM) emulsion   0   • oxyCODONE-acetaminophen (PERCOCET)  MG per tablet TAKE 1 TABLET BY MOUTH EVERY 4 TO 6 HOURS AS NEEDED FOR PAIN  0   • [DISCONTINUED] ELIQUIS 5 MG tablet tablet Take 1 tablet by mouth 2 (Two) Times a Day.  5     No facility-administered encounter medications on file as of 12/20/2018.        Reviewed use of high risk medication in the elderly: yes  Reviewed for potential of harmful drug interactions in the elderly: yes    Follow Up:  Return in about 3 months (around 3/20/2019), or if symptoms worsen or fail to improve.     An After Visit Summary and PPPS with all of these plans were given to the patient.

## 2018-12-27 ENCOUNTER — TELEPHONE (OUTPATIENT)
Dept: FAMILY MEDICINE CLINIC | Facility: CLINIC | Age: 68
End: 2018-12-27

## 2019-03-20 RX ORDER — PRAMIPEXOLE DIHYDROCHLORIDE 1 MG/1
1 TABLET ORAL 2 TIMES DAILY
Qty: 180 TABLET | Refills: 0 | Status: SHIPPED | OUTPATIENT
Start: 2019-03-20 | End: 2019-06-28 | Stop reason: SDUPTHER

## 2019-03-20 RX ORDER — CYCLOBENZAPRINE HCL 5 MG
5 TABLET ORAL
Qty: 90 TABLET | Refills: 0 | Status: SHIPPED | OUTPATIENT
Start: 2019-03-20

## 2019-05-30 RX ORDER — PRAMIPEXOLE DIHYDROCHLORIDE 1 MG/1
1 TABLET ORAL 2 TIMES DAILY
Qty: 60 TABLET | Refills: 1 | Status: SHIPPED | OUTPATIENT
Start: 2019-05-30 | End: 2019-07-02 | Stop reason: SDUPTHER

## 2019-06-28 ENCOUNTER — OFFICE VISIT (OUTPATIENT)
Dept: FAMILY MEDICINE CLINIC | Facility: CLINIC | Age: 69
End: 2019-06-28

## 2019-06-28 VITALS
DIASTOLIC BLOOD PRESSURE: 92 MMHG | OXYGEN SATURATION: 97 % | SYSTOLIC BLOOD PRESSURE: 138 MMHG | TEMPERATURE: 97 F | HEIGHT: 69 IN | WEIGHT: 218 LBS | HEART RATE: 87 BPM | BODY MASS INDEX: 32.29 KG/M2

## 2019-06-28 DIAGNOSIS — G25.81 RESTLESS LEG SYNDROME: ICD-10-CM

## 2019-06-28 DIAGNOSIS — Z23 NEED FOR 23-POLYVALENT PNEUMOCOCCAL POLYSACCHARIDE VACCINE: ICD-10-CM

## 2019-06-28 DIAGNOSIS — C77.0 METASTASIS TO HEAD AND NECK LYMPH NODE (HCC): ICD-10-CM

## 2019-06-28 DIAGNOSIS — I82.402 ACUTE DEEP VEIN THROMBOSIS (DVT) OF LEFT LOWER EXTREMITY, UNSPECIFIED VEIN (HCC): ICD-10-CM

## 2019-06-28 DIAGNOSIS — E66.3 OVERWEIGHT (BMI 25.0-29.9): ICD-10-CM

## 2019-06-28 DIAGNOSIS — C07 PRIMARY PAROTID GLAND MALIGNANCY (HCC): Primary | ICD-10-CM

## 2019-06-28 DIAGNOSIS — G89.18 PAIN FOLLOWING SURGERY OR PROCEDURE: ICD-10-CM

## 2019-06-28 PROCEDURE — 90732 PPSV23 VACC 2 YRS+ SUBQ/IM: CPT | Performed by: FAMILY MEDICINE

## 2019-06-28 PROCEDURE — G0009 ADMIN PNEUMOCOCCAL VACCINE: HCPCS | Performed by: FAMILY MEDICINE

## 2019-06-28 PROCEDURE — 99214 OFFICE O/P EST MOD 30 MIN: CPT | Performed by: FAMILY MEDICINE

## 2019-06-28 RX ORDER — PANTOPRAZOLE SODIUM 40 MG/1
40 TABLET, DELAYED RELEASE ORAL DAILY
Refills: 3 | COMMUNITY
Start: 2019-06-17

## 2019-06-28 RX ORDER — ASPIRIN 81 MG/1
81 TABLET ORAL DAILY
COMMUNITY

## 2019-06-28 NOTE — PROGRESS NOTES
Subjective   Chief Complaint   Patient presents with   • Restless Legs Syndrome   • Depression     Álvaro Estrada is a 69 y.o. male.   Restless Legs Syndrome and Depression    History of Present Illness     [facility] - parotid carcinoma, nausea, RLS, GERD, DVT    DVT of the left lower extremity - managed with eliquis    RLS - managed with mirapex    Patient was diagnosed with with head and neck cancer - parotid cancer diagnosed on the right side.  The patient's symptoms progressed to signs of Bell's palsy but the patient's symptoms never improved.  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  He was then referred to Dr Ramon for further treatment - chemotherapy and radiation have been performed and finished April 5th 2018.  Port placed for chemotherapy on the left upper chest wall and gastrostomy tube surgery was performed by Dr Polanco.  Gastrostomy tube has since been removed due to good nutritional status.     He was recently found to have 2 masses by PET scan in the right cervical region and pathology indicated recurrence of cancerous cells and he was recommended to restart chemotherapy with carboplatin/taxol.  He is currently undergoing chemotherapy once a week x 6 months.  He has completed 3 treatments to date.  He was given a poor prognosis with 2- 3 years.    The following portions of the patient's history were reviewed and updated as appropriate: allergies, current medications, past family history, past medical history, past social history, past surgical history and problem list.    Review of Systems   Constitutional: Negative for appetite change, chills, fatigue and fever.   HENT: Negative for congestion, ear pain, rhinorrhea and sore throat.    Eyes: Negative for pain.   Respiratory: Negative for cough and shortness of breath.    Cardiovascular: Negative for chest pain and palpitations.   Gastrointestinal:  "Negative for abdominal pain, constipation, diarrhea and nausea.   Genitourinary: Negative for dysuria.   Musculoskeletal: Negative for back pain, joint swelling and neck pain.   Skin: Negative for rash.   Neurological: Negative for dizziness and headaches.       Objective   /92   Pulse 87   Temp 97 °F (36.1 °C) (Oral)   Ht 175.3 cm (69\")   Wt 98.9 kg (218 lb)   SpO2 97%   BMI 32.19 kg/m²   Physical Exam   Constitutional: He is oriented to person, place, and time. He appears well-developed and well-nourished.   HENT:   Head: Normocephalic and atraumatic.   Right facial paralysis   Eyes: Pupils are equal, round, and reactive to light. Right eye exhibits chemosis.   Right lower lid drooping   Neck: Neck supple. Muscular tenderness present. Decreased range of motion present.       Cardiovascular: Normal rate, regular rhythm and normal heart sounds.   Pulmonary/Chest: Effort normal and breath sounds normal. No respiratory distress. He has no wheezes. He has no rales.   Abdominal: Soft. Bowel sounds are normal.   Neurological: He is alert and oriented to person, place, and time.   Skin: Skin is warm and dry.   Psychiatric: He has a normal mood and affect.   Nursing note and vitals reviewed.      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 BMI is above normal parameters. Recommendations include: nutrition counseling.    Will refer to new primary care provider  Wife would like to try to find a new primary provider near home in [place].

## 2019-07-02 RX ORDER — PRAMIPEXOLE DIHYDROCHLORIDE 1 MG/1
1 TABLET ORAL 2 TIMES DAILY
Qty: 60 TABLET | Refills: 0 | Status: SHIPPED | OUTPATIENT
Start: 2019-07-02